# Patient Record
Sex: MALE | Race: WHITE | NOT HISPANIC OR LATINO
[De-identification: names, ages, dates, MRNs, and addresses within clinical notes are randomized per-mention and may not be internally consistent; named-entity substitution may affect disease eponyms.]

---

## 2019-01-24 PROBLEM — Z00.00 ENCOUNTER FOR PREVENTIVE HEALTH EXAMINATION: Status: ACTIVE | Noted: 2019-01-24

## 2019-01-28 ENCOUNTER — APPOINTMENT (OUTPATIENT)
Dept: ORTHOPEDIC SURGERY | Facility: CLINIC | Age: 49
End: 2019-01-28

## 2019-05-21 ENCOUNTER — APPOINTMENT (OUTPATIENT)
Dept: ORTHOPEDIC SURGERY | Facility: CLINIC | Age: 49
End: 2019-05-21
Payer: COMMERCIAL

## 2019-05-21 VITALS — RESPIRATION RATE: 16 BRPM | HEIGHT: 69 IN | WEIGHT: 170 LBS | BODY MASS INDEX: 25.18 KG/M2

## 2019-05-21 DIAGNOSIS — Z82.61 FAMILY HISTORY OF ARTHRITIS: ICD-10-CM

## 2019-05-21 DIAGNOSIS — Z78.9 OTHER SPECIFIED HEALTH STATUS: ICD-10-CM

## 2019-05-21 DIAGNOSIS — M77.11 LATERAL EPICONDYLITIS, RIGHT ELBOW: ICD-10-CM

## 2019-05-21 DIAGNOSIS — Z86.69 PERSONAL HISTORY OF OTHER DISEASES OF THE NERVOUS SYSTEM AND SENSE ORGANS: ICD-10-CM

## 2019-05-21 DIAGNOSIS — Z80.3 FAMILY HISTORY OF MALIGNANT NEOPLASM OF BREAST: ICD-10-CM

## 2019-05-21 PROCEDURE — 99203 OFFICE O/P NEW LOW 30 MIN: CPT

## 2019-05-21 PROCEDURE — 73070 X-RAY EXAM OF ELBOW: CPT | Mod: 50

## 2019-05-30 ENCOUNTER — APPOINTMENT (OUTPATIENT)
Dept: SURGERY | Facility: CLINIC | Age: 49
End: 2019-05-30
Payer: COMMERCIAL

## 2019-05-30 VITALS
WEIGHT: 168 LBS | HEIGHT: 69 IN | BODY MASS INDEX: 24.88 KG/M2 | TEMPERATURE: 98.3 F | DIASTOLIC BLOOD PRESSURE: 68 MMHG | HEART RATE: 58 BPM | SYSTOLIC BLOOD PRESSURE: 107 MMHG

## 2019-05-30 DIAGNOSIS — Z82.49 FAMILY HISTORY OF ISCHEMIC HEART DISEASE AND OTHER DISEASES OF THE CIRCULATORY SYSTEM: ICD-10-CM

## 2019-05-30 DIAGNOSIS — R10.11 RIGHT UPPER QUADRANT PAIN: ICD-10-CM

## 2019-05-30 DIAGNOSIS — Z86.69 PERSONAL HISTORY OF OTHER DISEASES OF THE NERVOUS SYSTEM AND SENSE ORGANS: ICD-10-CM

## 2019-05-30 DIAGNOSIS — Z97.2 PRESENCE OF DENTAL PROSTHETIC DEVICE (COMPLETE) (PARTIAL): ICD-10-CM

## 2019-05-30 PROCEDURE — 99203 OFFICE O/P NEW LOW 30 MIN: CPT

## 2019-05-30 NOTE — VITALS
[de-identified] : 6/10 [FreeTextEntry3] : KIMMIE  [FreeTextEntry1] : nothing  [FreeTextEntry2] : movement, food intake

## 2019-05-30 NOTE — PHYSICAL EXAM
[Abdominal Masses] : No abdominal masses [Alert] : alert [Oriented to Person] : oriented to person [Oriented to Place] : oriented to place [Oriented to Time] : oriented to time [Calm] : calm [de-identified] : The patient is alert, well-groomed, and cheerful. [de-identified] :  HEENT  Head is normocephalic/atraumatic. Conjunctiva pink, anicteric.  Nasal mucosa pink, septum midline. Oral mucosa pink.  Tongue midline, Pharynx without exudates. [de-identified] : Thorax symmetric with good excursion. Lungs resonant. Breath sounds vesicular with no added sounds. [de-identified] : No signs of  dyspnea, orthopnea. Good S1 and  S2 [de-identified] :  mild right upper quadrant tenderness with no guarding and no rebound.

## 2019-05-30 NOTE — ASSESSMENT
[FreeTextEntry1] :  Patient was told significance of findings, options, risks and benefits were explained.  patient was advised to have an Abdominal US to r/o GB stones.\par

## 2019-05-30 NOTE — HISTORY OF PRESENT ILLNESS
[de-identified] : This is a 49 years old patient  with the chief complaint of having right upper quadrant   pain for 2 weeks .  Pain is non-radiating . he describes it a spasm and it occurs when he changes positions. Patient reports-  no nausea or vomiting and no history of jaundice, acholia or choluria.   Appetite is good and weight is stable.   Patient  has no family history of biliary tract disease. On Examination patient has  mild right upper quadrant tenderness with no guarding and no rebound.  \par

## 2019-08-15 ENCOUNTER — CLINICAL ADVICE (OUTPATIENT)
Age: 49
End: 2019-08-15

## 2019-08-29 ENCOUNTER — APPOINTMENT (OUTPATIENT)
Dept: ORTHOPEDIC SURGERY | Facility: CLINIC | Age: 49
End: 2019-08-29
Payer: SELF-PAY

## 2019-08-29 VITALS — BODY MASS INDEX: 24.88 KG/M2 | WEIGHT: 168 LBS | RESPIRATION RATE: 16 BRPM | HEIGHT: 69 IN

## 2019-08-29 PROCEDURE — 99214 OFFICE O/P EST MOD 30 MIN: CPT

## 2019-08-29 RX ORDER — OXYCODONE AND ACETAMINOPHEN 5; 325 MG/1; MG/1
5-325 TABLET ORAL
Qty: 20 | Refills: 0 | Status: ACTIVE | COMMUNITY
Start: 2019-08-29 | End: 1900-01-01

## 2019-09-18 ENCOUNTER — APPOINTMENT (OUTPATIENT)
Dept: ORTHOPEDIC SURGERY | Facility: CLINIC | Age: 49
End: 2019-09-18
Payer: COMMERCIAL

## 2019-09-18 VITALS — HEIGHT: 69 IN | WEIGHT: 168 LBS | BODY MASS INDEX: 24.88 KG/M2 | RESPIRATION RATE: 16 BRPM

## 2019-09-18 DIAGNOSIS — M77.12 LATERAL EPICONDYLITIS, LEFT ELBOW: ICD-10-CM

## 2019-09-18 PROCEDURE — 99214 OFFICE O/P EST MOD 30 MIN: CPT

## 2019-10-02 ENCOUNTER — APPOINTMENT (OUTPATIENT)
Dept: ORTHOPEDIC SURGERY | Facility: CLINIC | Age: 49
End: 2019-10-02

## 2019-11-19 ENCOUNTER — CLINICAL ADVICE (OUTPATIENT)
Age: 49
End: 2019-11-19